# Patient Record
Sex: FEMALE | HISPANIC OR LATINO | URBAN - METROPOLITAN AREA
[De-identification: names, ages, dates, MRNs, and addresses within clinical notes are randomized per-mention and may not be internally consistent; named-entity substitution may affect disease eponyms.]

---

## 2020-07-25 ENCOUNTER — TELEPHONE ENCOUNTER (OUTPATIENT)
Dept: URBAN - METROPOLITAN AREA CLINIC 13 | Facility: CLINIC | Age: 60
End: 2020-07-25

## 2020-07-25 RX ORDER — ROSUVASTATIN CALCIUM 5 MG
TAKE 1 TABLET DAILY TABLET ORAL
Refills: 0 | OUTPATIENT
Start: 2010-01-06 | End: 2010-05-07

## 2020-07-25 RX ORDER — INSULIN ASPART 100 [IU]/ML
INJECT SUBCUTANEOUSLY AS DIRECTED INJECTION, SOLUTION INTRAVENOUS; SUBCUTANEOUS
Refills: 0 | OUTPATIENT
End: 2016-08-01

## 2020-07-25 RX ORDER — METFORMIN HYDROCHLORIDE 1000 MG/1
TAKE 1 TABLET DAILY WITH FOOD TABLET, COATED ORAL
Refills: 0 | OUTPATIENT
Start: 2010-01-06 | End: 2010-05-07

## 2020-07-26 ENCOUNTER — TELEPHONE ENCOUNTER (OUTPATIENT)
Dept: URBAN - METROPOLITAN AREA CLINIC 13 | Facility: CLINIC | Age: 60
End: 2020-07-26

## 2020-07-26 RX ORDER — MELATONIN 3 MG
TAKE 1 CAPSULE AT BEDTIME AS NEEDED CAPSULE ORAL
Refills: 0 | Status: ACTIVE | COMMUNITY

## 2020-07-26 RX ORDER — LISINOPRIL 20 MG/1
TAKE 1 TABLET DAILY TABLET ORAL
Refills: 0 | Status: ACTIVE | COMMUNITY

## 2020-07-26 RX ORDER — LIRAGLUTIDE 6 MG/ML
INJECT 1.2 MG SUBCUTANEOUSLY EVERY DAY INJECTION SUBCUTANEOUS
Refills: 0 | Status: ACTIVE | COMMUNITY

## 2020-07-26 RX ORDER — ESOMEPRAZOLE MAGNESIUM 40 MG/1
TAKE 1 CAPSULE DAILY CAPSULE, DELAYED RELEASE PELLETS ORAL
Qty: 30 | Refills: 5 | Status: ACTIVE | COMMUNITY
Start: 2016-07-08

## 2020-07-26 RX ORDER — INSULIN GLARGINE 300 U/ML
INJECT 20 UNIT BEDTIME INJECTION, SOLUTION SUBCUTANEOUS
Refills: 0 | Status: ACTIVE | COMMUNITY

## 2020-10-08 ENCOUNTER — TELEPHONE ENCOUNTER (OUTPATIENT)
Dept: URBAN - METROPOLITAN AREA CLINIC 113 | Facility: CLINIC | Age: 60
End: 2020-10-08

## 2020-10-08 ENCOUNTER — LAB OUTSIDE AN ENCOUNTER (OUTPATIENT)
Dept: URBAN - METROPOLITAN AREA CLINIC 113 | Facility: CLINIC | Age: 60
End: 2020-10-08

## 2020-10-08 VITALS — HEIGHT: 63 IN | WEIGHT: 178 LBS | BODY MASS INDEX: 31.54 KG/M2

## 2020-10-08 RX ORDER — METFORMIN HYDROCHLORIDE 1000 MG/1
1 TABLET WITH A MEAL TABLET, FILM COATED ORAL ONCE A DAY
Qty: 30 TABLET | Status: ACTIVE | COMMUNITY

## 2020-10-08 RX ORDER — POLYETHYLENE GLYCOL 3350, SODIUM CHLORIDE, SODIUM BICARBONATE, POTASSIUM CHLORIDE 420; 11.2; 5.72; 1.48 G/4L; G/4L; G/4L; G/4L
AS DIRECTED POWDER, FOR SOLUTION ORAL
Qty: 1 | Refills: 0 | OUTPATIENT
Start: 2020-10-08 | End: 2020-10-09

## 2020-10-08 RX ORDER — LISINOPRIL AND HYDROCHLOROTHIAZIDE 20; 12.5 MG/1; MG/1
1 TABLET TABLET ORAL ONCE A DAY
Qty: 30 TABLET | Status: ACTIVE | COMMUNITY

## 2020-10-08 RX ORDER — LISINOPRIL 20 MG/1
TAKE 1 TABLET DAILY TABLET ORAL
Refills: 0 | Status: ON HOLD | COMMUNITY

## 2020-10-08 RX ORDER — DAPAGLIFLOZIN 10 MG/1
1 TABLET TABLET, FILM COATED ORAL ONCE A DAY
Qty: 30 TABLET | Status: ACTIVE | COMMUNITY

## 2020-10-08 RX ORDER — MELATONIN 3 MG
TAKE 1 CAPSULE AT BEDTIME AS NEEDED CAPSULE ORAL
Refills: 0 | Status: ON HOLD | COMMUNITY

## 2020-10-08 RX ORDER — ESOMEPRAZOLE MAGNESIUM 40 MG/1
TAKE 1 CAPSULE DAILY CAPSULE, DELAYED RELEASE PELLETS ORAL
Qty: 30 | Refills: 5 | Status: ON HOLD | COMMUNITY
Start: 2016-07-08

## 2020-10-08 RX ORDER — ESOMEPRAZOLE MAGNESIUM 40 MG/1
1 CAPSULE CAPSULE, DELAYED RELEASE ORAL ONCE A DAY
Qty: 30 CAPSULE | Status: ACTIVE | COMMUNITY

## 2020-10-08 RX ORDER — INSULIN GLARGINE 300 U/ML
INJECT 20 UNIT BEDTIME INJECTION, SOLUTION SUBCUTANEOUS
Refills: 0 | Status: ON HOLD | COMMUNITY

## 2020-10-08 RX ORDER — LIRAGLUTIDE 6 MG/ML
INJECT 1.2 MG SUBCUTANEOUSLY EVERY DAY INJECTION SUBCUTANEOUS
Refills: 0 | Status: ON HOLD | COMMUNITY

## 2020-10-08 RX ORDER — INSULIN DEGLUDEC INJECTION 100 U/ML
AS DIRECTED INJECTION, SOLUTION SUBCUTANEOUS
Status: ACTIVE | COMMUNITY

## 2020-10-16 ENCOUNTER — OFFICE VISIT (OUTPATIENT)
Dept: URBAN - METROPOLITAN AREA SURGERY CENTER 25 | Facility: SURGERY CENTER | Age: 60
End: 2020-10-16
Payer: COMMERCIAL

## 2020-10-16 ENCOUNTER — CLAIMS CREATED FROM THE CLAIM WINDOW (OUTPATIENT)
Dept: URBAN - METROPOLITAN AREA CLINIC 4 | Facility: CLINIC | Age: 60
End: 2020-10-16
Payer: COMMERCIAL

## 2020-10-16 DIAGNOSIS — D12.4 BENIGN NEOPLASM OF DESCENDING COLON: ICD-10-CM

## 2020-10-16 DIAGNOSIS — K63.89 BACTERIAL OVERGROWTH SYNDROME: ICD-10-CM

## 2020-10-16 DIAGNOSIS — Z86.010 H/O ADENOMATOUS POLYP OF COLON: ICD-10-CM

## 2020-10-16 PROCEDURE — 45385 COLONOSCOPY W/LESION REMOVAL: CPT | Performed by: INTERNAL MEDICINE

## 2020-10-16 PROCEDURE — G8907 PT DOC NO EVENTS ON DISCHARG: HCPCS | Performed by: INTERNAL MEDICINE

## 2020-10-16 PROCEDURE — 88305 TISSUE EXAM BY PATHOLOGIST: CPT | Performed by: PATHOLOGY

## 2020-10-16 PROCEDURE — G9936 PMH PLYP/NEO CO/RECT/JUN/ANS: HCPCS | Performed by: INTERNAL MEDICINE

## 2020-10-16 RX ORDER — LISINOPRIL 20 MG/1
TAKE 1 TABLET DAILY TABLET ORAL
Refills: 0 | Status: ON HOLD | COMMUNITY

## 2020-10-16 RX ORDER — ESOMEPRAZOLE MAGNESIUM 40 MG/1
TAKE 1 CAPSULE DAILY CAPSULE, DELAYED RELEASE PELLETS ORAL
Qty: 30 | Refills: 5 | Status: ON HOLD | COMMUNITY
Start: 2016-07-08

## 2020-10-16 RX ORDER — INSULIN DEGLUDEC INJECTION 100 U/ML
AS DIRECTED INJECTION, SOLUTION SUBCUTANEOUS
Status: ACTIVE | COMMUNITY

## 2020-10-16 RX ORDER — LISINOPRIL AND HYDROCHLOROTHIAZIDE 20; 12.5 MG/1; MG/1
1 TABLET TABLET ORAL ONCE A DAY
Qty: 30 TABLET | Status: ACTIVE | COMMUNITY

## 2020-10-16 RX ORDER — MELATONIN 3 MG
TAKE 1 CAPSULE AT BEDTIME AS NEEDED CAPSULE ORAL
Refills: 0 | Status: ON HOLD | COMMUNITY

## 2020-10-16 RX ORDER — DAPAGLIFLOZIN 10 MG/1
1 TABLET TABLET, FILM COATED ORAL ONCE A DAY
Qty: 30 TABLET | Status: ACTIVE | COMMUNITY

## 2020-10-16 RX ORDER — ESOMEPRAZOLE MAGNESIUM 40 MG/1
1 CAPSULE CAPSULE, DELAYED RELEASE ORAL ONCE A DAY
Qty: 30 CAPSULE | Status: ACTIVE | COMMUNITY

## 2020-10-16 RX ORDER — INSULIN GLARGINE 300 U/ML
INJECT 20 UNIT BEDTIME INJECTION, SOLUTION SUBCUTANEOUS
Refills: 0 | Status: ON HOLD | COMMUNITY

## 2020-10-16 RX ORDER — METFORMIN HYDROCHLORIDE 1000 MG/1
1 TABLET WITH A MEAL TABLET, FILM COATED ORAL ONCE A DAY
Qty: 30 TABLET | Status: ACTIVE | COMMUNITY

## 2020-10-16 RX ORDER — LIRAGLUTIDE 6 MG/ML
INJECT 1.2 MG SUBCUTANEOUSLY EVERY DAY INJECTION SUBCUTANEOUS
Refills: 0 | Status: ON HOLD | COMMUNITY

## 2020-11-16 ENCOUNTER — OFFICE VISIT (OUTPATIENT)
Dept: URBAN - METROPOLITAN AREA CLINIC 113 | Facility: CLINIC | Age: 60
End: 2020-11-16

## 2020-11-19 ENCOUNTER — OFFICE VISIT (OUTPATIENT)
Dept: URBAN - METROPOLITAN AREA CLINIC 113 | Facility: CLINIC | Age: 60
End: 2020-11-19
Payer: COMMERCIAL

## 2020-11-19 VITALS
DIASTOLIC BLOOD PRESSURE: 81 MMHG | TEMPERATURE: 97.3 F | BODY MASS INDEX: 32.25 KG/M2 | SYSTOLIC BLOOD PRESSURE: 123 MMHG | WEIGHT: 182 LBS | HEIGHT: 63 IN | HEART RATE: 70 BPM

## 2020-11-19 DIAGNOSIS — R13.10 DYSPHAGIA: ICD-10-CM

## 2020-11-19 DIAGNOSIS — K59.09 CONSTIPATION - FUNCTIONAL: ICD-10-CM

## 2020-11-19 DIAGNOSIS — K57.90 DIVERTICULOSIS: ICD-10-CM

## 2020-11-19 PROCEDURE — G9903 PT SCRN TBCO ID AS NON USER: HCPCS | Performed by: NURSE PRACTITIONER

## 2020-11-19 PROCEDURE — G8483 FLU IMM NO ADMIN DOC REA: HCPCS | Performed by: NURSE PRACTITIONER

## 2020-11-19 PROCEDURE — 99213 OFFICE O/P EST LOW 20 MIN: CPT | Performed by: NURSE PRACTITIONER

## 2020-11-19 PROCEDURE — G8420 CALC BMI NORM PARAMETERS: HCPCS | Performed by: NURSE PRACTITIONER

## 2020-11-19 PROCEDURE — 3017F COLORECTAL CA SCREEN DOC REV: CPT | Performed by: NURSE PRACTITIONER

## 2020-11-19 PROCEDURE — G8427 DOCREV CUR MEDS BY ELIG CLIN: HCPCS | Performed by: NURSE PRACTITIONER

## 2020-11-19 RX ORDER — INSULIN DEGLUDEC INJECTION 100 U/ML
AS DIRECTED INJECTION, SOLUTION SUBCUTANEOUS
Status: ACTIVE | COMMUNITY

## 2020-11-19 RX ORDER — LIRAGLUTIDE 6 MG/ML
INJECT 1.2 MG SUBCUTANEOUSLY EVERY DAY INJECTION SUBCUTANEOUS
Refills: 0 | Status: ON HOLD | COMMUNITY

## 2020-11-19 RX ORDER — LISINOPRIL 20 MG/1
TAKE 1 TABLET DAILY TABLET ORAL
Refills: 0 | Status: ON HOLD | COMMUNITY

## 2020-11-19 RX ORDER — METFORMIN HYDROCHLORIDE 1000 MG/1
1 TABLET WITH A MEAL TABLET, FILM COATED ORAL ONCE A DAY
Qty: 30 TABLET | Status: ACTIVE | COMMUNITY

## 2020-11-19 RX ORDER — INSULIN GLARGINE 300 U/ML
INJECT 20 UNIT BEDTIME INJECTION, SOLUTION SUBCUTANEOUS
Refills: 0 | Status: ON HOLD | COMMUNITY

## 2020-11-19 RX ORDER — ESOMEPRAZOLE MAGNESIUM 40 MG/1
TAKE 1 CAPSULE DAILY CAPSULE, DELAYED RELEASE PELLETS ORAL
Qty: 30 | Refills: 5 | Status: ON HOLD | COMMUNITY
Start: 2016-07-08

## 2020-11-19 RX ORDER — MELATONIN 3 MG
TAKE 1 CAPSULE AT BEDTIME AS NEEDED CAPSULE ORAL
Refills: 0 | Status: ON HOLD | COMMUNITY

## 2020-11-19 RX ORDER — ESOMEPRAZOLE MAGNESIUM 40 MG/1
1 CAPSULE CAPSULE, DELAYED RELEASE ORAL ONCE A DAY
Qty: 30 CAPSULE | Status: ACTIVE | COMMUNITY

## 2020-11-19 RX ORDER — UBIDECARENONE 30 MG
AS DIRECTED CAPSULE ORAL
Status: ACTIVE | COMMUNITY

## 2020-11-19 RX ORDER — DAPAGLIFLOZIN 10 MG/1
1 TABLET TABLET, FILM COATED ORAL ONCE A DAY
Qty: 30 TABLET | Status: ACTIVE | COMMUNITY

## 2020-11-19 RX ORDER — LISINOPRIL AND HYDROCHLOROTHIAZIDE 20; 12.5 MG/1; MG/1
1 TABLET TABLET ORAL ONCE A DAY
Qty: 30 TABLET | Status: ACTIVE | COMMUNITY

## 2020-11-19 NOTE — HPI-TODAY'S VISIT:
60-year-old female presenting for follow-up after colonoscopy. Colonoscopy was performed 10/16/2020 for surveillance purposes given personal history of colon polyps.  Findings included fair bowel preparation with [place] bowel preparation scale score of 5.  Diverticulosis in the transverse colon.  Distal rectum and anal verge were normal.  Removal of a 3 mm descending colon polyp with pathology demonstrating prominent mucosal lymphoid aggregates negative for dysplasia or malignancy.  She is recommended repeat colonoscopy in 1 year as part of colon cancer prevention due to suboptimal bowel cleansing.  Would recommend following 2-day bowel preparation at that time.  She did not tolerate the bowel preparation, stating that it made her gag. She reports constipation, characterized by bowel movements every 2 or 3 days. She can have days with loose stools related to using Metformin for diabetes management. She reports a sensation of bloating associated with increasing constipation. She describes a sensation of incomplete emptying. There is no nausea or vomiting. There is no abdominal pain. She does admit some liquid dysphagia with difficulty noted at the sternal notch. No obstructive symptoms.

## 2021-01-25 ENCOUNTER — OFFICE VISIT (OUTPATIENT)
Dept: URBAN - METROPOLITAN AREA CLINIC 113 | Facility: CLINIC | Age: 61
End: 2021-01-25
Payer: COMMERCIAL

## 2021-01-25 VITALS
DIASTOLIC BLOOD PRESSURE: 73 MMHG | HEART RATE: 87 BPM | WEIGHT: 180 LBS | SYSTOLIC BLOOD PRESSURE: 112 MMHG | HEIGHT: 63 IN | TEMPERATURE: 97.1 F | BODY MASS INDEX: 31.89 KG/M2

## 2021-01-25 DIAGNOSIS — K59.09 CONSTIPATION - FUNCTIONAL: ICD-10-CM

## 2021-01-25 DIAGNOSIS — K57.90 DIVERTICULOSIS: ICD-10-CM

## 2021-01-25 DIAGNOSIS — R13.10 DYSPHAGIA: ICD-10-CM

## 2021-01-25 PROCEDURE — G8427 DOCREV CUR MEDS BY ELIG CLIN: HCPCS | Performed by: INTERNAL MEDICINE

## 2021-01-25 PROCEDURE — 99213 OFFICE O/P EST LOW 20 MIN: CPT | Performed by: INTERNAL MEDICINE

## 2021-01-25 PROCEDURE — 3017F COLORECTAL CA SCREEN DOC REV: CPT | Performed by: INTERNAL MEDICINE

## 2021-01-25 PROCEDURE — G9903 PT SCRN TBCO ID AS NON USER: HCPCS | Performed by: INTERNAL MEDICINE

## 2021-01-25 PROCEDURE — G8482 FLU IMMUNIZE ORDER/ADMIN: HCPCS | Performed by: INTERNAL MEDICINE

## 2021-01-25 RX ORDER — ESOMEPRAZOLE MAGNESIUM 40 MG/1
1 CAPSULE CAPSULE, DELAYED RELEASE ORAL ONCE A DAY
Qty: 30 CAPSULE | Status: ACTIVE | COMMUNITY

## 2021-01-25 RX ORDER — DAPAGLIFLOZIN 10 MG/1
1 TABLET TABLET, FILM COATED ORAL ONCE A DAY
Qty: 30 TABLET | Status: ACTIVE | COMMUNITY

## 2021-01-25 RX ORDER — INSULIN DEGLUDEC INJECTION 100 U/ML
AS DIRECTED INJECTION, SOLUTION SUBCUTANEOUS
Status: ACTIVE | COMMUNITY

## 2021-01-25 RX ORDER — MELATONIN 3 MG
TAKE 1 CAPSULE AT BEDTIME AS NEEDED CAPSULE ORAL
Refills: 0 | Status: ON HOLD | COMMUNITY

## 2021-01-25 RX ORDER — UBIDECARENONE 30 MG
AS DIRECTED CAPSULE ORAL
Status: ACTIVE | COMMUNITY

## 2021-01-25 RX ORDER — LISINOPRIL 20 MG/1
TAKE 1 TABLET DAILY TABLET ORAL
Refills: 0 | Status: ON HOLD | COMMUNITY

## 2021-01-25 RX ORDER — LISINOPRIL AND HYDROCHLOROTHIAZIDE 20; 12.5 MG/1; MG/1
1 TABLET TABLET ORAL ONCE A DAY
Qty: 30 TABLET | Status: ACTIVE | COMMUNITY

## 2021-01-25 RX ORDER — ATORVASTATIN CALCIUM 20 MG/1
1 TABLET UNKNOWN DOSAGE TABLET, FILM COATED ORAL ONCE A DAY
Status: ACTIVE | COMMUNITY

## 2021-01-25 RX ORDER — LIRAGLUTIDE 6 MG/ML
INJECT 1.2 MG SUBCUTANEOUSLY EVERY DAY INJECTION SUBCUTANEOUS
Refills: 0 | Status: ON HOLD | COMMUNITY

## 2021-01-25 RX ORDER — METFORMIN HYDROCHLORIDE 1000 MG/1
1 TABLET WITH A MEAL TABLET, FILM COATED ORAL ONCE A DAY
Qty: 30 TABLET | Status: ACTIVE | COMMUNITY

## 2021-01-25 RX ORDER — INSULIN GLARGINE 300 U/ML
INJECT 20 UNIT BEDTIME INJECTION, SOLUTION SUBCUTANEOUS
Refills: 0 | Status: ON HOLD | COMMUNITY

## 2021-01-25 RX ORDER — ESOMEPRAZOLE MAGNESIUM 40 MG/1
TAKE 1 CAPSULE DAILY CAPSULE, DELAYED RELEASE PELLETS ORAL
Qty: 30 | Refills: 5 | Status: ON HOLD | COMMUNITY
Start: 2016-07-08

## 2021-01-25 NOTE — HPI-TODAY'S VISIT:
60-year-old female presenting for follow-up after colonoscopy. Colonoscopy was performed 10/16/2020 for surveillance purposes given personal history of colon polyps.  Findings included fair bowel preparation with [place] bowel preparation scale score of 5.  Diverticulosis in the transverse colon.  Distal rectum and anal verge were normal.  Removal of a 3 mm descending colon polyp with pathology demonstrating prominent mucosal lymphoid aggregates negative for dysplasia or malignancy.  She is recommended repeat colonoscopy in 1 year as part of colon cancer prevention due to suboptimal bowel cleansing.  Would recommend following 2-day bowel preparation at that time.  she is taking her fiber supplements a tablespoon each day.  She is not taking any MiraLAX.  She is having a bowel movement every other day.  There is no straining.  There is no blood.  She has some nausea mostly in the morning but no vomiting.  She believes is related to her medication.  She has difficulty swallowing both liquids and solids.  Liquids seem to be worse in the solids.  She tries to initiate a swallow and feels like it will go down like a spasm she states.  This occurs with solid food as well.  Once the solid food is going down it does not get stuck.  She's had no weight loss.  She did have a modified barium swallow was unremarkable.

## 2022-10-28 ENCOUNTER — WEB ENCOUNTER (OUTPATIENT)
Dept: URBAN - METROPOLITAN AREA CLINIC 113 | Facility: CLINIC | Age: 62
End: 2022-10-28

## 2022-10-28 ENCOUNTER — DASHBOARD ENCOUNTERS (OUTPATIENT)
Age: 62
End: 2022-10-28

## 2022-10-28 ENCOUNTER — LAB OUTSIDE AN ENCOUNTER (OUTPATIENT)
Dept: URBAN - METROPOLITAN AREA CLINIC 113 | Facility: CLINIC | Age: 62
End: 2022-10-28

## 2022-10-28 ENCOUNTER — OFFICE VISIT (OUTPATIENT)
Dept: URBAN - METROPOLITAN AREA CLINIC 113 | Facility: CLINIC | Age: 62
End: 2022-10-28
Payer: COMMERCIAL

## 2022-10-28 VITALS
HEIGHT: 63 IN | DIASTOLIC BLOOD PRESSURE: 88 MMHG | SYSTOLIC BLOOD PRESSURE: 140 MMHG | TEMPERATURE: 97.5 F | HEART RATE: 105 BPM | WEIGHT: 178 LBS | BODY MASS INDEX: 31.54 KG/M2

## 2022-10-28 DIAGNOSIS — R13.10 DYSPHAGIA: ICD-10-CM

## 2022-10-28 DIAGNOSIS — K57.90 DIVERTICULOSIS: ICD-10-CM

## 2022-10-28 DIAGNOSIS — Z86.010 H/O ADENOMATOUS POLYPS OF COLON: ICD-10-CM

## 2022-10-28 DIAGNOSIS — K59.09 CONSTIPATION - FUNCTIONAL: ICD-10-CM

## 2022-10-28 PROBLEM — 398050005 DIVERTICULAR DISEASE OF COLON: Status: ACTIVE | Noted: 2020-11-19

## 2022-10-28 PROBLEM — 40739000 DYSPHAGIA: Status: ACTIVE | Noted: 2020-11-19

## 2022-10-28 PROCEDURE — 99203 OFFICE O/P NEW LOW 30 MIN: CPT | Performed by: INTERNAL MEDICINE

## 2022-10-28 RX ORDER — LISINOPRIL AND HYDROCHLOROTHIAZIDE 20; 12.5 MG/1; MG/1
1 TABLET TABLET ORAL ONCE A DAY
Qty: 30 TABLET | Status: ACTIVE | COMMUNITY

## 2022-10-28 RX ORDER — SODIUM, POTASSIUM,MAG SULFATES 17.5-3.13G
354 ML SOLUTION, RECONSTITUTED, ORAL ORAL ONCE
Qty: 354 ML | Refills: 0 | OUTPATIENT
Start: 2022-10-28 | End: 2022-10-29

## 2022-10-28 RX ORDER — ESOMEPRAZOLE MAGNESIUM 40 MG/1
1 CAPSULE CAPSULE, DELAYED RELEASE ORAL ONCE A DAY
Qty: 30 CAPSULE | Status: ACTIVE | COMMUNITY

## 2022-10-28 RX ORDER — MELATONIN 3 MG
TAKE 1 CAPSULE AT BEDTIME AS NEEDED CAPSULE ORAL
Refills: 0 | Status: ON HOLD | COMMUNITY

## 2022-10-28 RX ORDER — LIRAGLUTIDE 6 MG/ML
INJECT 1.2 MG SUBCUTANEOUSLY EVERY DAY INJECTION SUBCUTANEOUS
Refills: 0 | Status: ON HOLD | COMMUNITY

## 2022-10-28 RX ORDER — ESOMEPRAZOLE MAGNESIUM 40 MG/1
TAKE 1 CAPSULE DAILY CAPSULE, DELAYED RELEASE PELLETS ORAL
Qty: 30 | Refills: 5 | Status: ON HOLD | COMMUNITY
Start: 2016-07-08

## 2022-10-28 RX ORDER — INSULIN ASPART 100 [IU]/ML
AS DIRECTED INJECTION, SOLUTION INTRAVENOUS; SUBCUTANEOUS
Status: ACTIVE | COMMUNITY

## 2022-10-28 RX ORDER — ONDANSETRON 4 MG/1
1 TABLET ON THE TONGUE AND ALLOW TO DISSOLVE TABLET, ORALLY DISINTEGRATING ORAL
Qty: 5 | Refills: 0 | OUTPATIENT
Start: 2022-10-28

## 2022-10-28 RX ORDER — INSULIN DEGLUDEC INJECTION 100 U/ML
AS DIRECTED INJECTION, SOLUTION SUBCUTANEOUS
Status: ACTIVE | COMMUNITY

## 2022-10-28 RX ORDER — UBIDECARENONE 30 MG
AS DIRECTED CAPSULE ORAL
Status: ACTIVE | COMMUNITY

## 2022-10-28 RX ORDER — METFORMIN HYDROCHLORIDE 1000 MG/1
1 TABLET WITH A MEAL TABLET, FILM COATED ORAL ONCE A DAY
Qty: 30 TABLET | Status: ACTIVE | COMMUNITY

## 2022-10-28 RX ORDER — DULAGLUTIDE 0.75 MG/.5ML
AS DIRECTED INJECTION, SOLUTION SUBCUTANEOUS
Status: ACTIVE | COMMUNITY

## 2022-10-28 RX ORDER — INSULIN GLARGINE 300 U/ML
INJECT 20 UNIT BEDTIME INJECTION, SOLUTION SUBCUTANEOUS
Refills: 0 | Status: ON HOLD | COMMUNITY

## 2022-10-28 RX ORDER — LISINOPRIL 20 MG/1
TAKE 1 TABLET DAILY TABLET ORAL
Refills: 0 | Status: ON HOLD | COMMUNITY

## 2022-10-28 RX ORDER — DAPAGLIFLOZIN 10 MG/1
1 TABLET TABLET, FILM COATED ORAL ONCE A DAY
Qty: 30 TABLET | Status: ACTIVE | COMMUNITY

## 2022-10-28 RX ORDER — ATORVASTATIN CALCIUM 20 MG/1
1 TABLET UNKNOWN DOSAGE TABLET, FILM COATED ORAL ONCE A DAY
Status: ACTIVE | COMMUNITY

## 2022-10-28 NOTE — HPI-OTHER HISTORIES
Colonoscopy on 10/6/2020 revealed a fair prep with thick adherent material in the cecum and ascending colon difficult to wash off.  A few medium size transverse colon diverticulosis and a 3 mm descending colon polyp.  This was benign mucosal lymphoid aggregates.  EGD on 8/1/2016 field a Z line at 32 cm with a large hiatal hernia.  There is erythema and congestion in the gastric antrum biopsies revealed chemical gastropathy negative for H. pylori.  There is a diverticulum in the [place] antrum.  There was a 25 mm polyp that was pedunculated in the cardia region biopsies revealed fundic gland polyp.  The duodenum was normal.

## 2022-10-28 NOTE — HPI-TODAY'S VISIT:
62-year-old female presenting for follow-up. Colonoscopy was performed 10/16/2020 for surveillance purposes given personal history of colon polyps.  Findings included fair bowel preparation with [place] bowel preparation scale score of 5.  Diverticulosis in the transverse colon.  Distal rectum and anal verge were normal.  Removal of a 3 mm descending colon polyp with pathology demonstrating prominent mucosal lymphoid aggregates negative for dysplasia or malignancy.  She is recommended repeat colonoscopy in 1 year as part of colon cancer prevention due to suboptimal bowel cleansing.  Would recommend following 2-day bowel preparation at that time.  She is doing very well and presents for repeat colonoscopy examination.  She had a suboptimal bowel prep previously.  She typically has a bowel movement every day now is been no blood or melena she occasionally has nausea but she attributes to medication.  She occasionally has left lower quadrant abdominal pain that comes and goes.  Her dysphagia symptoms are better.  She has no heartburn on Nexium.  She does have to be careful when she swallows and eats. She did have a modified barium swallow was unremarkable.

## 2022-12-16 ENCOUNTER — CLAIMS CREATED FROM THE CLAIM WINDOW (OUTPATIENT)
Dept: URBAN - METROPOLITAN AREA SURGERY CENTER 25 | Facility: SURGERY CENTER | Age: 62
End: 2022-12-16
Payer: COMMERCIAL

## 2022-12-16 ENCOUNTER — CLAIMS CREATED FROM THE CLAIM WINDOW (OUTPATIENT)
Dept: URBAN - METROPOLITAN AREA SURGERY CENTER 25 | Facility: SURGERY CENTER | Age: 62
End: 2022-12-16

## 2022-12-16 DIAGNOSIS — Z86.010 H/O ADENOMATOUS POLYPS OF COLON: ICD-10-CM

## 2022-12-16 PROCEDURE — G8907 PT DOC NO EVENTS ON DISCHARG: HCPCS | Performed by: INTERNAL MEDICINE

## 2022-12-16 PROCEDURE — 45378 DIAGNOSTIC COLONOSCOPY: CPT | Performed by: INTERNAL MEDICINE

## 2022-12-16 RX ORDER — LISINOPRIL 20 MG/1
TAKE 1 TABLET DAILY TABLET ORAL
Refills: 0 | Status: ON HOLD | COMMUNITY

## 2022-12-16 RX ORDER — ONDANSETRON 4 MG/1
1 TABLET ON THE TONGUE AND ALLOW TO DISSOLVE TABLET, ORALLY DISINTEGRATING ORAL
Qty: 5 | Refills: 0 | Status: ACTIVE | COMMUNITY
Start: 2022-10-28

## 2022-12-16 RX ORDER — MELATONIN 3 MG
TAKE 1 CAPSULE AT BEDTIME AS NEEDED CAPSULE ORAL
Refills: 0 | Status: ON HOLD | COMMUNITY

## 2022-12-16 RX ORDER — ESOMEPRAZOLE MAGNESIUM 40 MG/1
TAKE 1 CAPSULE DAILY CAPSULE, DELAYED RELEASE PELLETS ORAL
Qty: 30 | Refills: 5 | Status: ON HOLD | COMMUNITY
Start: 2016-07-08

## 2022-12-16 RX ORDER — METFORMIN HYDROCHLORIDE 1000 MG/1
1 TABLET WITH A MEAL TABLET, FILM COATED ORAL ONCE A DAY
Qty: 30 TABLET | Status: ACTIVE | COMMUNITY

## 2022-12-16 RX ORDER — LIRAGLUTIDE 6 MG/ML
INJECT 1.2 MG SUBCUTANEOUSLY EVERY DAY INJECTION SUBCUTANEOUS
Refills: 0 | Status: ON HOLD | COMMUNITY

## 2022-12-16 RX ORDER — DULAGLUTIDE 0.75 MG/.5ML
AS DIRECTED INJECTION, SOLUTION SUBCUTANEOUS
Status: ACTIVE | COMMUNITY

## 2022-12-16 RX ORDER — DAPAGLIFLOZIN 10 MG/1
1 TABLET TABLET, FILM COATED ORAL ONCE A DAY
Qty: 30 TABLET | Status: ACTIVE | COMMUNITY

## 2022-12-16 RX ORDER — LISINOPRIL AND HYDROCHLOROTHIAZIDE 20; 12.5 MG/1; MG/1
1 TABLET TABLET ORAL ONCE A DAY
Qty: 30 TABLET | Status: ACTIVE | COMMUNITY

## 2022-12-16 RX ORDER — INSULIN ASPART 100 [IU]/ML
AS DIRECTED INJECTION, SOLUTION INTRAVENOUS; SUBCUTANEOUS
Status: ACTIVE | COMMUNITY

## 2022-12-16 RX ORDER — ESOMEPRAZOLE MAGNESIUM 40 MG/1
1 CAPSULE CAPSULE, DELAYED RELEASE ORAL ONCE A DAY
Qty: 30 CAPSULE | Status: ACTIVE | COMMUNITY

## 2022-12-16 RX ORDER — INSULIN GLARGINE 300 U/ML
INJECT 20 UNIT BEDTIME INJECTION, SOLUTION SUBCUTANEOUS
Refills: 0 | Status: ON HOLD | COMMUNITY

## 2022-12-16 RX ORDER — UBIDECARENONE 30 MG
AS DIRECTED CAPSULE ORAL
Status: ACTIVE | COMMUNITY

## 2022-12-16 RX ORDER — INSULIN DEGLUDEC INJECTION 100 U/ML
AS DIRECTED INJECTION, SOLUTION SUBCUTANEOUS
Status: ACTIVE | COMMUNITY

## 2022-12-16 RX ORDER — ATORVASTATIN CALCIUM 20 MG/1
1 TABLET UNKNOWN DOSAGE TABLET, FILM COATED ORAL ONCE A DAY
Status: ACTIVE | COMMUNITY

## 2023-01-04 PROBLEM — 428283002 HISTORY OF POLYP OF COLON: Status: ACTIVE | Noted: 2020-10-08

## 2023-01-13 ENCOUNTER — OFFICE VISIT (OUTPATIENT)
Dept: URBAN - METROPOLITAN AREA CLINIC 113 | Facility: CLINIC | Age: 63
End: 2023-01-13